# Patient Record
(demographics unavailable — no encounter records)

---

## 2021-08-06 NOTE — EDM.PDOC
ED HPI GENERAL MEDICAL PROBLEM





- General


Chief Complaint: General


Stated Complaint: Fall


Time Seen by Provider: 08/06/21 17:32


Source of Information: Reports: Patient


History Limitations: Reports: No Limitations





- History of Present Illness


INITIAL COMMENTS - FREE TEXT/NARRATIVE: 





This patient is a 20 year old female that presents to the ER via EMS. The 

grandmother is also at bedside and is also good historian. The patient reports 

she is 14 weeks pregnant tomorrow. She reports that she was at the store with 

her grandmother and was at the counter paying when she became nauseous. She 

reports that she stepped outside to get some air. She reports that she was 

outside and felt like she was going to pass out. She reports getting sweaty, 

feeling faint, then blacking out. She reports she remembers waking up and 

knowing where she was. The patient reports when she woke up, her hands and feet 

were tingling and she was anxious and short of breath. The grandmother reports 

that the patient passed out and fell to the ground hitting the back of her head.

She reports the patient was passed out for about less than 1 minute. She reports

that the patient was saying she did not feel well before passing out. She 

reports that the patient became sweaty and passed out falling backwards. She 

reports after less than 1 minute, the patient woke up and was oriented to 

herself and where she was. Grandmother reports the patient remembered what had 

happened. Grandmother reports patient was talking when she awoke. Grandmother 

reports patient did not get up from ground, ambulance arrived and get her on the

stretcher. The grandmother reports patient hands were curled inward like 

"cerebral palsy." Grandmother reports the patient was anxious. The patient is 

alert and oriented in ER. She denies urinary or bowel incontinence. Mother who 

is in exam room reports the patient has struggled with being pregnant. Mother 

reports patient has been more anxious and nauseated. The patient reports that 

she did have hx of fast HR, her PCP put her on Metoprolol, but took her off once

learning of her pregnancy. She also was taking Prozac, but no longer taking due 

to pregnancy.  


Onset: Today


Onset Date: 08/06/21


Onset Time: 16:30





- Related Data


                                    Allergies











Allergy/AdvReac Type Severity Reaction Status Date / Time


 


Penicillins Allergy  Rash Verified 08/06/21 17:35











Home Meds: 


                                    Home Meds





Albuterol [Proair HFA] 2 inh INH Q6H PRN 02/14/17 [History]


Ondansetron [Zofran ODT] 4 mg PO Q6H PRN 08/06/21 [History]


Pnv No.95/Ferrous Fum/Folic AC [Prenatal Multivitamin Tablet] 1 each PO DAILY 

08/06/21 [History]











Past Medical History





- Past Health History


Medical/Surgical History: Denies Medical/Surgical History


Psychiatric History: Reports: Anxiety, Depression





- Past Surgical History


HEENT Surgical History: Reports: Tonsillectomy


GI Surgical History: Reports: Other (See Below)


Other GI Surgeries/Procedures: UMBILICAL HERNIA AT 4 YO





Social & Family History





- Family History


Family Medical History: No Pertinent Family History





- Tobacco Use


Tobacco Use Status *Q: Never Tobacco User


Second Hand Smoke Exposure: No





- Caffeine Use


Caffeine Use: Reports: None





ED ROS GENERAL





- Review of Systems


Review Of Systems: See Below


Constitutional: Reports: Fatigue, Diaphoresis


HEENT: Reports: No Symptoms


Respiratory: Reports: Shortness of Breath (resolved, once arrived. ).  Denies: 

Wheezing, Pleuritic Chest Pain, Cough, Sputum


Cardiovascular: Reports: Lightheadedness


GI/Abdominal: Reports: Nausea.  Denies: Abdominal Pain, Vomiting


: Reports: No Symptoms


Musculoskeletal: Reports: Shoulder Pain (left), Arm Pain (left upper, left 

lower), Back Pain (mid/lower.), Leg Pain, Muscle Pain (generalized body aches)


Skin: Reports: Diaphoresis


Neurological: Reports: Headache, Syncope, Tingling (bilateral hands, resolved in

 ER. ), Other (Bilateral hands inward like "cerebral palsy" per grandmother. ). 

 Denies: Seizure, Tremors, Trouble Speaking, Change in Speech


Psychiatric: Reports: Anxiety


Hematologic/Lymphatic: Reports: No Symptoms


Immunologic: Reports: No Symptoms





ED EXAM, GENERAL





- Physical Exam


Exam: See Below


Exam Limited By: No Limitations


General Appearance: Alert, WD/WN, No Apparent Distress, Anxious


Eye Exam: Bilateral Eye: EOMI, Normal Inspection, PERRL


Ears: Normal External Exam, Normal Canal, Hearing Grossly Normal, Normal TMs, 

Other (no fluid seen bilaterally)


Ear Exam: Bilateral Ear: Auricle Normal, Canal Normal, TM normal


Nose: Normal Inspection, Normal Mucosa, No Blood, Other (no drainage or fluid fr

om nose is seen on exam)


Throat/Mouth: Normal Inspection, Normal Lips, Normal Teeth, Normal Gums, Normal 

Oropharynx, Normal Voice, No Airway Compromise


Head: Atraumatic, Normocephalic, Other (mild tenderness posterior scalp. No 

hematoma, swelling, bruising, lacerations, abrasions, wounds. ).  No: Facial 

Swelling, Facial Tenderness, Sinus Tenderness


Neck: Normal Inspection, Supple, Non-Tender, Full Range of Motion.  No: Limited 

Range of Motion, Tender Lateral, Tender Midline


Respiratory/Chest: No Respiratory Distress, Lungs Clear, Normal Breath Sounds, 

No Accessory Muscle Use, Other (Right lateral wall tenderness mild. No crepitus,

 breath sounds equal, no flail chest, no splinting. ).  No: Wheezing


Cardiovascular: Normal Peripheral Pulses, Regular Rate, Rhythm, No Edema, No 

Gallop, No JVD, No Murmur, No Rub


Peripheral Pulses: 2+: Radial (L), Radial (R), Posterior Tibial (L), Posterior 

Tibial (R), Dorsalis Pedis (L), Dorsalis Pedis (R)


GI/Abdominal: Soft, Non-Tender, Pelvis Stable.  No: Guarding, Rigid, Rebound


 (Female) Exam: Deferred


Rectal (Female) Exam: Deferred


Back Exam: Normal Inspection, Full Range of Motion, Paraspinal Tenderness 

(thoracic, lumbar. ).  No: CVA Tenderness (L), CVA Tenderness (R), Decreased 

Range of Motion, Muscle Spasm, Vertebral Tenderness (no step offs)


Extremities: Normal Inspection, Normal Range of Motion, No Pedal Edema, Normal 

Capillary Refill, Other (tenderness left shoulder, left upper arm, left elbow, 

left hip, right upper arm. All extremeties have good ROM, Pulses + 2, cap refil 

< 2 sec, sensory/motor function intact, neurovascular intact. )


Neurological: Alert, Oriented, CN II-XII Intact, Normal Cognition, Normal Gait, 

No Motor/Sensory Deficits


Psychiatric: Anxious


Skin Exam: Warm, Intact, Normal Color, No Rash, Diaphoretic


  ** #1 Interpretation


EKG Date: 08/06/21


Time: 18:20


Rhythm: NSR


Rate (Beats/Min): 82


P-Wave: Present


QRS: Normal


ST-T: Normal


QT: Normal


Comparison: NA - No Prior EKG





Course





- Vital Signs


Last Recorded V/S: 


                                Last Vital Signs











Temp  97.4 F   08/06/21 17:30


 


Pulse  79   08/06/21 17:30


 


Resp  26 H  08/06/21 17:30


 


BP  102/63   08/06/21 17:30


 


Pulse Ox  96   08/06/21 17:30














- Orders/Labs/Meds


Orders: 


                               Active Orders 24 hr











 Category Date Time Status


 


 EKG Documentation Completion [RC] STAT Care  08/06/21 18:03 Active


 


 Fetal Heart Tones [Fetal Heart Rate] [RC] Click to Edit Care  08/06/21 18:40 

Active


 


 TYPE AND SCREEN [BBK] Stat Lab  08/06/21 18:16 Received


 


 Lactated Ringers [Ringers, Lactated] 1,000 ml Med  08/06/21 19:10 Ordered





 IV .BOLUS   











Labs: 


                                Laboratory Tests











  08/06/21 08/06/21 08/06/21 Range/Units





  18:15 18:15 18:39 


 


WBC  6.8    (4.0-11.0)  10^3/uL


 


RBC  4.14    (4.00-5.50)  x10^6/uL


 


Hgb  12.4    (12.0-16.0)  g/dL


 


Hct  34.6 L    (37.0-47.0)  %


 


MCV  83.6    (83.0-97.0)  fL


 


MCH  30.0    (27.0-32.0)  pg


 


MCHC  35.8    (32.0-36.0)  g/dL


 


RDW Coeff of Abby  12.0    (11.0-15.0)  %


 


Plt Count  227    (150-400)  10^3/uL


 


Immature Gran % (Auto)  0.1    (0.0-4.9)  %


 


Neut % (Auto)  77.1 H    (41-71)  %


 


Lymph % (Auto)  15.7 L    (24-44)  %


 


Mono % (Auto)  6.2    (0-10)  %


 


Eos % (Auto)  0.3    (0-6)  %


 


Baso % (Auto)  0.6    (0-1)  %


 


Neut # (Auto)  5.26    (1.80-8.00)  x10^3/uL


 


Lymph # (Auto)  1.07    (0.60-5.00)  10^3/uL


 


Mono # (Auto)  0.42    (0.00-1.50)  10^3/uL


 


Eos # (Auto)  0.02    (0.00-1.50)  10^3/uL


 


Baso # (Auto)  0.04    (0.00-0.50)  10^3/uL


 


Immature Gran # (Auto)  0.01    (0.00-0.49)  10^3/uL


 


Sodium   137   (136-145)  mEq/L


 


Potassium   3.3 L D   (3.5-5.0)  mEq/L


 


Chloride   103   ()  mEq/L


 


Carbon Dioxide   22   (21-32)  mmol/L


 


BUN   6 L   (7-18)  mg/dL


 


Creatinine   0.6   (0.6-1.0)  mg/dL


 


Est Cr Clr Drug Dosing   129.15   mL/min


 


Estimated GFR (MDRD)   > 60   (>=60)  mL/min


 


Glucose   95   (75-99)  mg/dL


 


Calcium   8.3 L   (8.4-10.1)  mg/dL


 


Total Bilirubin   0.6   (0.0-1.0)  mg/dL


 


AST   24   (15-37)  U/L


 


ALT   28   (12-78)  U/L


 


Alkaline Phosphatase   87   ()  U/L


 


Lactate Dehydrogenase   144   (100-190)  U/L


 


Creatine Kinase   43   ()  U/L


 


Troponin I   < 0.017   (0.00-0.06)  ng/mL


 


Total Protein   6.5   (6.4-8.2)  g/dL


 


Albumin   2.9 L   (3.4-5.0)  g/dL


 


Urine Color     (YELLOW)  


 


Urine Appearance     (CLEAR)  


 


Urine pH     (4.5-8.0)  


 


Ur Specific Gravity     (1.003-1.020)  


 


Urine Protein     (NEGATIVE)  mg/dL


 


Urine Glucose (UA)     (NEGATIVE)  mg/dL


 


Urine Ketones     (NEGATIVE)  mg/dL


 


Urine Occult Blood     (NEGATIVE)  


 


Urine Nitrite     (NEGATIVE)  


 


Urine Bilirubin     (NEGATIVE)  


 


Urine Urobilinogen     (0.2-1.0)  EU/dL


 


Ur Leukocyte Esterase     (NEGATIVE)  


 


Urine HCG, Qual     


 


Urine Opiates Screen    Negative  (NEGATIVE)  


 


Ur Oxycodone Screen    Negative  (NEGATIVE)  


 


Urine Methadone Screen    Negative  (NEGATIVE)  


 


Ur Barbiturates Screen    Negative  (NEGATIVE)  


 


U Tricyclic Antidepress    Negative  (NEGATIVE)  


 


Ur Phencyclidine Scrn    Negative  (NEGATIVE)  


 


Ur Amphetamine Screen    Negative  (NEGATIVE)  


 


U Methamphetamines Scrn    Negative  (NEGATIVE)  


 


Urine MDMA Screen    Negative  (NEGATIVE)  


 


U Benzodiazepines Scrn    Negative  (NEGATIVE)  


 


Urine Cocaine Screen    Negative  (NEGATIVE)  


 


U Marijuana (THC) Screen    Negative  (NEGATIVE)  














  08/06/21 08/06/21 Range/Units





  18:41 18:41 


 


WBC    (4.0-11.0)  10^3/uL


 


RBC    (4.00-5.50)  x10^6/uL


 


Hgb    (12.0-16.0)  g/dL


 


Hct    (37.0-47.0)  %


 


MCV    (83.0-97.0)  fL


 


MCH    (27.0-32.0)  pg


 


MCHC    (32.0-36.0)  g/dL


 


RDW Coeff of Abby    (11.0-15.0)  %


 


Plt Count    (150-400)  10^3/uL


 


Immature Gran % (Auto)    (0.0-4.9)  %


 


Neut % (Auto)    (41-71)  %


 


Lymph % (Auto)    (24-44)  %


 


Mono % (Auto)    (0-10)  %


 


Eos % (Auto)    (0-6)  %


 


Baso % (Auto)    (0-1)  %


 


Neut # (Auto)    (1.80-8.00)  x10^3/uL


 


Lymph # (Auto)    (0.60-5.00)  10^3/uL


 


Mono # (Auto)    (0.00-1.50)  10^3/uL


 


Eos # (Auto)    (0.00-1.50)  10^3/uL


 


Baso # (Auto)    (0.00-0.50)  10^3/uL


 


Immature Gran # (Auto)    (0.00-0.49)  10^3/uL


 


Sodium    (136-145)  mEq/L


 


Potassium    (3.5-5.0)  mEq/L


 


Chloride    ()  mEq/L


 


Carbon Dioxide    (21-32)  mmol/L


 


BUN    (7-18)  mg/dL


 


Creatinine    (0.6-1.0)  mg/dL


 


Est Cr Clr Drug Dosing    mL/min


 


Estimated GFR (MDRD)    (>=60)  mL/min


 


Glucose    (75-99)  mg/dL


 


Calcium    (8.4-10.1)  mg/dL


 


Total Bilirubin    (0.0-1.0)  mg/dL


 


AST    (15-37)  U/L


 


ALT    (12-78)  U/L


 


Alkaline Phosphatase    ()  U/L


 


Lactate Dehydrogenase    (100-190)  U/L


 


Creatine Kinase    ()  U/L


 


Troponin I    (0.00-0.06)  ng/mL


 


Total Protein    (6.4-8.2)  g/dL


 


Albumin    (3.4-5.0)  g/dL


 


Urine Color  Dark yellow   (YELLOW)  


 


Urine Appearance  Slightly cloudy   (CLEAR)  


 


Urine pH  7.0   (4.5-8.0)  


 


Ur Specific Gravity  1.020   (1.003-1.020)  


 


Urine Protein  Negative   (NEGATIVE)  mg/dL


 


Urine Glucose (UA)  Negative   (NEGATIVE)  mg/dL


 


Urine Ketones  15 H   (NEGATIVE)  mg/dL


 


Urine Occult Blood  Negative   (NEGATIVE)  


 


Urine Nitrite  Negative   (NEGATIVE)  


 


Urine Bilirubin  Negative   (NEGATIVE)  


 


Urine Urobilinogen  1.0   (0.2-1.0)  EU/dL


 


Ur Leukocyte Esterase  Negative   (NEGATIVE)  


 


Urine HCG, Qual   Positive  


 


Urine Opiates Screen    (NEGATIVE)  


 


Ur Oxycodone Screen    (NEGATIVE)  


 


Urine Methadone Screen    (NEGATIVE)  


 


Ur Barbiturates Screen    (NEGATIVE)  


 


U Tricyclic Antidepress    (NEGATIVE)  


 


Ur Phencyclidine Scrn    (NEGATIVE)  


 


Ur Amphetamine Screen    (NEGATIVE)  


 


U Methamphetamines Scrn    (NEGATIVE)  


 


Urine MDMA Screen    (NEGATIVE)  


 


U Benzodiazepines Scrn    (NEGATIVE)  


 


Urine Cocaine Screen    (NEGATIVE)  


 


U Marijuana (THC) Screen    (NEGATIVE)  














- Re-Assessments/Exams


Free Text/Narrative Re-Assessment/Exam: 





08/06/21 18:10


I spent 20 minutes with this patent discussing imaging vs no imaging. The risk 

and benefits. The patient had +LOC, but prior to fall. She is alert and 

oriented. No vomiting. No neurological deficits. GCS 15. No andreia step offs, no 

+ETOH evidence, no hemotympanum, no figueroa sign, no raccoon eyes. Will not CT. 

Patient, grandmother, and mother agree with this plan. This patent per 

grandmother had no shaking seizure activity on scene, she was not post ictal, no

urinary or bowel incontinence. No hx of epilepsy. 





08/06/21 18:50


. 





08/06/21 18:55


Reviewed all labs. Called and spoke to Dr. Brandon Dunn her OB. He reports he 

agrees at this time with no head ct. He reports to observe patient another hour,

if no vomiting, seizure, or loc, or neuro changes, may discharge home. He 

reports can give her a liter of LR. 





08/06/21 19:00


I spoke with patient, grandmother, mother, and boyfriend for 15 minutes about 

all results, possible reasoning for Syncope, her f/u with OB, and reasons for 

observation and concussion education. Patient is fully alert and oriented, GCS 

15. She reports she feels much better. Will observe and discharge home if no 

changes in her condition. 








Departure





- Departure


Time of Disposition: 20:45


Disposition: Home, Self-Care 01


Condition: Good


Clinical Impression: 


 Vaso vagal episode





Pregnancy


Qualifiers:


 Weeks of gestation: 14 weeks Qualified Code(s): Z3A.14 - 14 weeks gestation of 

pregnancy





Syncope


Qualifiers:


 Syncope type: vasovagal syncope Qualified Code(s): R55 - Syncope and collapse





Concussion


Qualifiers:


 Encounter type: initial encounter Loss of consciousness presence/duration: with

LOC of 30 min or less Qualified Code(s): S06.0X1A - Concussion with loss of 

consciousness of 30 minutes or less, initial encounter








- Discharge Information


*PRESCRIPTION DRUG MONITORING PROGRAM REVIEWED*: Not Applicable


*COPY OF PRESCRIPTION DRUG MONITORING REPORT IN PATIENT FLACO: Not Applicable


Instructions:  Head Injury, Adult, Easy-to-Read, Concussion, Adult, 

Easy-to-Read, Syncope, Easy-to-Read, Prenatal Care


Referrals: 


PCP,None [Primary Care Provider] - 


Forms:  ED Department Discharge


Additional Instructions: 


Followup with your OB by calling office Monday to make or confirm your current 

appointment


Return to the ER for vomiting, passing out, confusion, seizures, or any concerns


Increase water intake


Go home and rest





Sepsis Event Note (ED)





- Evaluation


Sepsis Screening Result: No Definite Risk





- Focused Exam


Vital Signs: 


                                   Vital Signs











  Temp Pulse Resp BP Pulse Ox


 


 08/06/21 17:30  97.4 F  79  26 H  102/63  96














- My Orders


Last 24 Hours: 


My Active Orders





08/06/21 18:03


EKG Documentation Completion [RC] STAT 





08/06/21 18:16


TYPE AND SCREEN [BBK] Stat 





08/06/21 18:40


Fetal Heart Tones [Fetal Heart Rate] [RC] Click to Edit 





08/06/21 19:10


Lactated Ringers [Ringers, Lactated] 1,000 ml IV .BOLUS 














- Assessment/Plan


Last 24 Hours: 


My Active Orders





08/06/21 18:03


EKG Documentation Completion [RC] STAT 





08/06/21 18:16


TYPE AND SCREEN [BBK] Stat 





08/06/21 18:40


Fetal Heart Tones [Fetal Heart Rate] [RC] Click to Edit 





08/06/21 19:10


Lactated Ringers [Ringers, Lactated] 1,000 ml IV .BOLUS 











Plan: 





PLEASE SEE RN NOTE FOR PFSH.

## 2021-08-08 NOTE — PCM.SN.2
- Free Text/Narrative


Note: 


08/08/21 1600


Martha FELIX reported to me the patient called and said she has been having 

vomiting the last 2 days. She was seen in ER for syncope and head injury. 

Martha reports the patient said this is not out of the norm for her due to 

pregnancy. Patient did not have head CT in ER due to pregnancy, no vomiting, no 

skull fracture felt on exam, no neurological deficits. Patient was alert and 

oriented in ER Friday. Martha FELIX reported to the patient that the ER is open 

if she feels like she needs to come in and be evaluated. I attempted to call the

patient via phone, left voicemail for her to call me back.





08/08/21 1645


 Patient called me back. She has reported to me she was fine when she left on 

Friday. She reported she was feeling good, no headache, no vomiting, no 

dizziness. She reports then as Friday evening progressed she started having some

nausea, then vomiting. She reports that on Friday evening she started taking her

Zofran that she takes for her vomiting with pregnancy. She reports usually the 

Zofran works, but it has not. She reports now vomiting since Friday evening. She

reports since yesterday morning having global headache, dizziness, and 

photophobia. Patient denies passing out, confusion. Since the patient is now 

vomiting, and her normal Zofran is not working, I recommend a head ct. I 

discussed with the patient risk vs benefits or radiation exposure, especially 

during pregnancy. She reports that she agrees to have the head ct performed. She

is aware of risks and accepts them. The patient reports that she will get 

dressed and have Bandar, her boyfriend bring her in to have head ct outpatient. 

Patient will be shielded. 





08/08/21 1900


Patient had head ct. Radiologist called me with results. There is chronic 

sinusitis, but no skull fracture, no bleed, no shift, no acute findings of 

brain. I then called patient via phone and told her the results. I explained 

that her vomiting could be caused from her pregnancy or concussion. Her OB is 

her PCP. I educated her to followup with him by calling office tomorrow for 

appointment for concussion vs pregnancy related vomiting. She voices back 

understanding.